# Patient Record
Sex: FEMALE | Race: BLACK OR AFRICAN AMERICAN | Employment: FULL TIME | URBAN - METROPOLITAN AREA
[De-identification: names, ages, dates, MRNs, and addresses within clinical notes are randomized per-mention and may not be internally consistent; named-entity substitution may affect disease eponyms.]

---

## 2017-05-11 RX ORDER — MYCOPHENOLATE MOFETIL 500 MG/1
TABLET ORAL
Qty: 60 TAB | Refills: 10 | Status: SHIPPED | OUTPATIENT
Start: 2017-05-11 | End: 2017-09-28

## 2017-05-19 ENCOUNTER — TELEPHONE (OUTPATIENT)
Dept: HEMATOLOGY | Age: 52
End: 2017-05-19

## 2017-05-19 NOTE — TELEPHONE ENCOUNTER
Returned pts phone call, pt was inquiring about refill for a medication. [facility] pharmacy was called to reorder meds for pt.

## 2017-05-22 RX ORDER — MYCOPHENOLATE MOFETIL 500 MG/1
TABLET ORAL
Qty: 60 TAB | Refills: 10 | Status: SHIPPED | OUTPATIENT
Start: 2017-05-22 | End: 2018-07-13 | Stop reason: SDUPTHER

## 2017-09-27 ENCOUNTER — TELEPHONE (OUTPATIENT)
Dept: HEMATOLOGY | Age: 52
End: 2017-09-27

## 2017-09-27 NOTE — TELEPHONE ENCOUNTER
Pt. Has upcoming appt. With Samanta Christine NP on 9/28/17 , Pt. States that she will not be able to attend that appt. Because she received \"a call for a reminder\" to late and she has to let her office know ahead of time to take off . .    I let pt know that Samanta Christine NP next available is not until November and she states \"that is unacceptable\" Pt. Also asked if she could see Dr. Mario Alberto Myrick , I let pt know that if she is to see Dr. Mario Alberto Myrick his next available is not until  November as well . Jean Pierre Chance Pt states she would like Samanta Christine NP or Dr. Mario Alberto Myrick to give her a call because, she feels she needs to be seen before . ..

## 2017-09-28 ENCOUNTER — HOSPITAL ENCOUNTER (OUTPATIENT)
Dept: LAB | Age: 52
Discharge: HOME | End: 2017-09-28
Payer: COMMERCIAL

## 2017-09-28 ENCOUNTER — OFFICE VISIT (OUTPATIENT)
Dept: HEMATOLOGY | Age: 52
End: 2017-09-28

## 2017-09-28 VITALS
WEIGHT: 175.2 LBS | BODY MASS INDEX: 29.91 KG/M2 | HEIGHT: 64 IN | TEMPERATURE: 97.5 F | SYSTOLIC BLOOD PRESSURE: 160 MMHG | DIASTOLIC BLOOD PRESSURE: 85 MMHG | HEART RATE: 108 BPM | RESPIRATION RATE: 16 BRPM | OXYGEN SATURATION: 100 %

## 2017-09-28 DIAGNOSIS — K75.4 AUTOIMMUNE HEPATITIS (HCC): ICD-10-CM

## 2017-09-28 DIAGNOSIS — K75.4 AUTOIMMUNE HEPATITIS (HCC): Primary | ICD-10-CM

## 2017-09-28 PROCEDURE — 80076 HEPATIC FUNCTION PANEL: CPT | Performed by: NURSE PRACTITIONER

## 2017-09-28 PROCEDURE — 85025 COMPLETE CBC W/AUTO DIFF WBC: CPT | Performed by: NURSE PRACTITIONER

## 2017-09-28 PROCEDURE — 36415 COLL VENOUS BLD VENIPUNCTURE: CPT | Performed by: NURSE PRACTITIONER

## 2017-09-28 PROCEDURE — 80048 BASIC METABOLIC PNL TOTAL CA: CPT | Performed by: NURSE PRACTITIONER

## 2017-09-28 NOTE — PROGRESS NOTES
[address] MD Tom, [address], [place], [place]       DILEEP Duarte PA-C Marylin Arm, [facility]   DILEEP Simpson NP        at [address], [address] [address]     [phone]     FAX: [phone]    at [address], [address] [facility], [address]     [phone]     FAX: [phone]       Problem List  Date Reviewed: 5/5/2016          Codes Class Noted    Autoimmune hepatitis [facility]) ICD-10-CM: K75.4  ICD-9-CM: 571.42  Unknown              Ms. Flavio Ghosh returns to the [address] regarding autoimmune hepatitis and it management. She has not been seen here since 05/2016. Her active problem list, all pertinent past medical history, medications, liver histology, endoscopic studies, radiologic findings and laboratory findings related to the liver disorder were reviewed with her. Abdulaziz Sierra had a liver biopsy in November 2010 that was consistent with autoimmune hepatitis in the setting of portal fibrosis. Ms. Flavio Ghosh is currently being treated with Cellcept 500 mg daily. The CellCept was ordered at 500 mg bid, but she self adjusted the dose without informing this office. She is tolerating treatment well. Ms. Oscar Gamble has not had any infections since her last office visit. Her transaminases have normalized. The patient has no complaints which can be attributed to liver disease. The patient completes all daily activities without any functional limitations.  The patient has not experienced fatigue, fevers, chills, shortness of breath, chest pain, pain in the right side over the liver, diffuse abdominal pain, nausea, vomiting, constipation, diarrhrea, dry eyes, dry mouth, arthralgias, myalgias, yellowing of the eyes or skin, itching, dark urine, problems concentrating, swelling of the abdomen, swelling of the lower extremities, hematemesis, or hematochezia. Allergies:  No known allergies. Current Outpatient Prescriptions   Medication Sig Dispense Refill    mycophenolate (CELLCEPT) 500 mg tablet TAKE 1 TABLET BY MOUTH TWICE DAILY 60 Tab 10       SYSTEM REVIEW NOT RELATED TO LIVER DISEASE OR REVIEWED ABOVE:  Constitution systems: Negative for fever, chills, weight gain, weight loss. Eyes: Negative for visual changes. ENT: Negative for sore throat, painful swallowing. Respiratory: Negative for cough, hemoptysis, SOB. Cardiology: Negative for chest pain, palpitations. GI:  Negative for constipation or diarrhea. : Negative for urinary frequency, dysuria, hematuria, nocturia. Skin: Negative for rash. Hematology: Negative for easy bruising, blood clots. Musculo-skelatal: Negative for back pain, muscle pain, weakness. Neurologic: Negative for headaches, dizziness, vertigo, memory problems not related to HE. Psychology: Negative for anxiety, depression. Family/Social History:  She is single with no children. She does not smoke tobacco.  She consumes alcohol rarely. She works full time as an . Physical Examination:    There were no vitals taken for this visit. General:  No acute distress. Eyes:  Sclera anicteric. ENT:  No oral lesions, thyroid normal.  Nodes:  No adenopathy. Skin:  No spider angiomata, jaundice, palmar erythema or xanthlasma. Respiratory:  Lungs clear to auscultation. Cardiovascular:  Regular heart rate, no murmurs, no edema, no JVD. Abdomen:  Soft non-tender, liver size normal to percussion/palpation. Spleen not palpable. No obvious ascites. Extremities:  No muscle wasting, no gross arthritic changes. Neurologic:  Alert and oriented, cranial nerves grossly intact, no asterixsis. Laboratory Studies:  From 05/2016:  AST/ ALT/ ALP/ T. BILI/ ALB: 23/ 22/ 107/ 0.3/ 4.0    From 10/5/2015  AST/ALT/ALP/T Bili/Alb: 19/17/91/0.5/3.7  WBC/HB/PLT: 8/13.1/244    From 12/2014  AST/ALT/ALP/T Bili: 22/21/120/0.4  WBC/HB/PLT: 6.1/12.6/212    6/2014  WBC/HgB/Plt:  5.9/12.7/201  AST/ALT/ALP/T. Bili/Alb:  24/23/124/0.4/3.8    Serologies and Other Pertinent Laboratories:  12/10:  RONAK positive, ASMA positive, ANCA negative, rheumatoid factor positive, LKM negative, SLA negative. 10/2015:  HAV antibody negative, HBV antibody negative    Liver Biopsy:  11/10:  Mild hepatitis with mild piecemeal necrosis and portal fibrosis. Biopsy slides need to be reviewed by MLS. Endoscopic Procedures:  None available. Radiology:  02/09:  Ultrasound liver - echogenic liver consistent with chronic liver disease, no liver mass lesions, no ascites. 09/10:  Ultrasound liver - echogenic liver consistent with chronic liver disease, no liver mass lesions, no ascites. Other Testing:  None available. Assessment and Plan:  Autoimmune hepatitis. Most recent labs are 14 months old. At that time the liver transaminases were normal, alkaline phosphatase was normal, tests of hepatic synthetic and metabolic function were normal.  Will perform laboratory testing to monitor liver function and degree of liver injury. This will include hepatic panel, a CBC w/ diff, a BMP. Autoimmune hepatitis is being treated with 500 mg CellCept daily. This was ordered as 500 mg bid, but the patient reduced itto 500 mg daily without informing this office. Will follow with today's labs and see if the dose needs to be adjusted back up. Vaccination for viral hepatitis A and B is recommended since the patient has no serologic evidence of previous exposure or vaccination with immunity. Alcohol counseling provided. [facility] 87 in six months.         Dk Beatty, NP   [facility] [place] [facility] [address], [address] [address] [place], [address] [place] [address]   [phone]

## 2017-09-28 NOTE — MR AVS SNAPSHOT
Visit Information Date & Time Provider Department Dept. Phone Encounter #  
 9/28/2017  4:30 PM [facility] [address] [id number] Follow-up Instructions Return in about 6 months (around 3/28/2018). Your Appointments 9/28/2017  4:30 PM  
[place] [address] [facility] ([address]) Appt Note: FOLLOW UP; f/up per Chapo Sorensen NP  
 [facility], [facility] [address]  
  
   
 [facility], [address] [place] [address] Upcoming Health Maintenance Date Due Hepatitis C Screening 1965 BREAST CANCER SCRN MAMMOGRAM 8/23/2013 FOBT Q 1 YEAR AGE 50-75 8/23/2013 PAP AKA CERVICAL CYTOLOGY 5/10/2016 INFLUENZA AGE 9 TO ADULT 8/1/2017 DTaP/Tdap/Td series (2 - Td) 1/1/2018 Allergies as of 9/28/2017  Review Complete On: 9/28/2017 By: Meredith Sena Severity Noted Reaction Type Reactions Bactrim [Sulfamethoprim Ds]  06/03/2014    Other (comments) Current Immunizations  Never Reviewed Name Date Tdap 1/1/2008 Not reviewed this visit You Were Diagnosed With   
  
 Codes Comments [facility]    -  Primary ICD-10-CM: K75.4 ICD-9-CM: 571.42 Vitals OB Status Smoking Status Postmenopausal Never Smoker Preferred Pharmacy Pharmacy Name Phone [address] [phone] Your Updated Medication List  
  
   
This list is accurate as of: 9/28/17  3:49 PM.  Always use your most recent med list.  
  
  
  
  
 mycophenolate 500 mg tablet Commonly known as:  CELLCEPT  
TAKE 1 TABLET BY MOUTH TWICE DAILY Follow-up Instructions Return in about 6 months (around 3/28/2018). Introducing [address] Amrit sr [employer] patient portal. Now you can access parts of your medical record, email your doctor's office, and request medication refills online. 1. In your internet browser, go to [email or web address] 2. Click on the First Time User? Click Here link in the Sign In box. You will see the New Member Sign Up page. 3. Enter your [employer] Access Code exactly as it appears below. You will not need to use this code after youve completed the sign-up process. If you do not sign up before the expiration date, you must request a new code. · [employer] Access Code: [email or web address] Expires: 12/27/2017  3:45 PM 
 
4. Enter the last four digits of your Social Security Number (xxxx) and Date of Birth (mm/dd/yyyy) as indicated and click Submit. You will be taken to the next sign-up page. 5. Create a [employer] ID. This will be your [employer] login ID and cannot be changed, so think of one that is secure and easy to remember. 6. Create a [employer] password. You can change your password at any time. 7. Enter your Password Reset Question and Answer. This can be used at a later time if you forget your password. 8. Enter your e-mail address. You will receive e-mail notification when new information is available in [address] 9. Click Sign Up. You can now view and download portions of your medical record. 10. Click the Download Summary menu link to download a portable copy of your medical information. If you have questions, please visit the Frequently Asked Questions section of the [employer] website. Remember, [employer] is NOT to be used for urgent needs. For medical emergencies, dial 911. Now available from your iPhone and Android! Please provide this summary of care documentation to your next provider. Your primary care clinician is listed as Live [place]. If you have any questions after today's visit, please call [phone].

## 2017-10-09 ENCOUNTER — TELEPHONE (OUTPATIENT)
Dept: HEMATOLOGY | Age: 52
End: 2017-10-09

## 2017-10-09 NOTE — TELEPHONE ENCOUNTER
[address] [facility] would like to verify if there's any changes with Mycophenolate 500 mg tablets?

## 2017-10-19 ENCOUNTER — TELEPHONE (OUTPATIENT)
Dept: HEMATOLOGY | Age: 52
End: 2017-10-19

## 2017-10-19 NOTE — TELEPHONE ENCOUNTER
Patient is calling to see what the call she missed was about. I informed her that it was to schedule an earlier appointment. Patient stated that she was just seen about two weeks ago. I informed her that I will send a message to the nurse that called her to sort out the situation. Patient confirmed understanding. Please call her at earliest convenience.

## 2017-12-28 ENCOUNTER — OFFICE VISIT (OUTPATIENT)
Dept: FAMILY MEDICINE CLINIC | Age: 52
End: 2017-12-28

## 2017-12-28 DIAGNOSIS — Z53.21 PATIENT LEFT WITHOUT BEING SEEN: Primary | ICD-10-CM

## 2018-02-02 PROBLEM — Z87.42 HISTORY OF ABNORMAL CERVICAL PAP SMEAR: Status: ACTIVE | Noted: 2018-02-02

## 2018-02-16 ENCOUNTER — TELEPHONE (OUTPATIENT)
Dept: INTERNAL MEDICINE CLINIC | Age: 53
End: 2018-02-16

## 2018-02-16 ENCOUNTER — OFFICE VISIT (OUTPATIENT)
Dept: INTERNAL MEDICINE CLINIC | Age: 53
End: 2018-02-16

## 2018-02-16 VITALS
WEIGHT: 178.8 LBS | BODY MASS INDEX: 30.52 KG/M2 | HEIGHT: 64 IN | DIASTOLIC BLOOD PRESSURE: 82 MMHG | RESPIRATION RATE: 12 BRPM | HEART RATE: 83 BPM | OXYGEN SATURATION: 99 % | TEMPERATURE: 97.7 F | SYSTOLIC BLOOD PRESSURE: 142 MMHG

## 2018-02-16 DIAGNOSIS — Z12.11 SCREENING FOR COLON CANCER: ICD-10-CM

## 2018-02-16 DIAGNOSIS — Z87.42 HISTORY OF ABNORMAL CERVICAL PAP SMEAR: ICD-10-CM

## 2018-02-16 DIAGNOSIS — K75.4 AUTOIMMUNE HEPATITIS (HCC): ICD-10-CM

## 2018-02-16 DIAGNOSIS — E66.9 OBESITY (BMI 30-39.9): ICD-10-CM

## 2018-02-16 DIAGNOSIS — Z13.220 SCREENING FOR HYPERLIPIDEMIA: ICD-10-CM

## 2018-02-16 DIAGNOSIS — Z12.31 ENCOUNTER FOR SCREENING MAMMOGRAM FOR BREAST CANCER: ICD-10-CM

## 2018-02-16 DIAGNOSIS — Z13.0 SCREENING FOR DEFICIENCY ANEMIA: ICD-10-CM

## 2018-02-16 DIAGNOSIS — R21 RASH: Primary | ICD-10-CM

## 2018-02-16 DIAGNOSIS — B36.0 TINEA VERSICOLOR: ICD-10-CM

## 2018-02-16 RX ORDER — KETOCONAZOLE 20 MG/G
CREAM TOPICAL DAILY
Qty: 60 G | Refills: 11 | Status: SHIPPED | OUTPATIENT
Start: 2018-02-16 | End: 2019-10-09

## 2018-02-16 RX ORDER — KETOCONAZOLE 20 MG/G
CREAM TOPICAL DAILY
Qty: 60 G | Refills: 11 | Status: SHIPPED | OUTPATIENT
Start: 2018-02-16 | End: 2018-02-16 | Stop reason: SDUPTHER

## 2018-02-16 RX ORDER — AMOXICILLIN 500 MG/1
500 CAPSULE ORAL 4 TIMES DAILY
COMMUNITY
Start: 2018-01-08 | End: 2018-02-16 | Stop reason: ALTCHOICE

## 2018-02-16 NOTE — MR AVS SNAPSHOT
[facility] 
 
 
 [address], [address] [facility] 
[phone] Patient: Gladys Weeks MRN: [id number] :1965 Visit Information Date & Time Provider Department Dept. Phone Encounter #  
 2018 10:00 AM Cuate Artis MD [facility] [phone] [id number] Follow-up Instructions Return in about 2 weeks (around 3/2/2018) for weight check. Your Appointments 3/9/2018 11:00 AM  
Office Visit with Cuate Artis MD  
[facility] [facility]) Appt Note: ov per TELECARE [facility] [address], [address] [place] [address] [place]  
  
   
 [address], [address]  
  
    
 3/28/2018  8:00 AM  
Follow Up with Umu Pedraza NP [address] [facility] ([address]) Appt Note: 6mnth f/up [address], [place] [address] [facility] [address] [facility] [address]  
  
   
 [address], [address] [facility] Upcoming Health Maintenance Date Due Hepatitis C Screening 1965 BREAST CANCER SCRN MAMMOGRAM 2013 FOBT Q 1 YEAR AGE 50-75 2013 PAP AKA CERVICAL CYTOLOGY 5/10/2016 Influenza Age 5 to Adult 2017 DTaP/Tdap/Td series (2 - Td) 2018 Allergies as of 2018  Review Complete On: 2018 By: Vivi Bolus Severity Noted Reaction Type Reactions Bactrim [Sulfamethoprim Ds]  2014    Other (comments) Current Immunizations  Never Reviewed Name Date Tdap 2008 Not reviewed this visit You Were Diagnosed With   
  
 Codes Comments Rash    -  Primary ICD-10-CM: R21 
ICD-9-CM: 782.1 Autoimmune hepatitis ([facility]     ICD-10-CM: K75.4 ICD-9-CM: 571.42 Screening for deficiency anemia     ICD-10-CM: Z13.0 ICD-9-CM: V78.1 Obesity (BMI 30-39. 9)     ICD-10-CM: E66.9 ICD-9-CM: 278.00   
 History of abnormal cervical Pap smear     ICD-10-CM: Z87.898 ICD-9-CM: V13.29 Encounter for screening mammogram for breast cancer     ICD-10-CM: Z12.31 
ICD-9-CM: V76.12 Tinea versicolor     ICD-10-CM: B36.0 ICD-9-CM: 111.0 Screening for colon cancer     ICD-10-CM: Z12.11 ICD-9-CM: V76.51 Vitals BP Pulse Temp Resp Height(growth percentile) Weight(growth percentile) 142/82 (BP 1 Location: Left arm, BP Patient Position: Sitting) 83 97.7 °F (36.5 °C) (Oral) 12 5' 4\" (1.626 m) 178 lb 12.8 oz (81.1 kg) SpO2 BMI OB Status Smoking Status 99% 30.69 kg/m2 Postmenopausal Never Smoker BMI and BSA Data Body Mass Index Body Surface Area  
 30.69 kg/m 2 1.91 m 2 Preferred Pharmacy Pharmacy Name Phone [address] [phone] Your Updated Medication List  
  
   
This list is accurate as of: 2/16/18 10:59 AM.  Always use your most recent med list.  
  
  
  
  
 ketoconazole 2 % topical cream  
Commonly known as:  Jodell Motto Apply  to affected area daily. mycophenolate 500 mg tablet Commonly known as:  CELLCEPT  
TAKE 1 TABLET BY MOUTH TWICE DAILY Prescriptions Sent to Pharmacy Refills  
 ketoconazole (NIZORAL) 2 % topical cream 11 Sig: Apply  to affected area daily. Class: Normal  
 Pharmacy: [facility]  #: [phone] Route: Topical  
  
We Performed the Following REFERRAL TO GYNECOLOGY [REF30 Custom] Comments:  
 Female provider only per pt request.  
  
Follow-up Instructions Return in about 2 weeks (around 3/2/2018) for weight check. To-Do List   
 02/16/2018 Lab:  RONAK COMPREHENSIVE PANEL   
  
 02/16/2018 Lab:  C REACTIVE PROTEIN, QT   
  
 02/16/2018 Lab:  CBC WITH AUTOMATED DIFF Around 02/16/2018 Lab:  HEMOGLOBIN A1C W/O EAG   
  
 02/16/2018   Lab:  LIPID PANEL   
 02/16/2018 Imaging:  OFELIA MAMMO BI SCREENING INCL CAD   
  
 02/16/2018 Lab:  METABOLIC PANEL, COMPREHENSIVE   
  
 02/16/2018 Lab:  OCCULT BLOOD, IMMUNOASSAY (FIT)   
  
 02/16/2018 Lab:  TSH AND FREE T4 Referral Information Referral ID Referred By Referred To  
  
 [id number] Mukul Jhaveri Not Available Visits Status Start Date End Date 1 New Request 2/16/18 2/16/19 If your referral has a status of pending review or denied, additional information will be sent to support the outcome of this decision. Patient Instructions Patient was given a copy of the Advanced Medical Directive form and understands to bring it in once completed. Health Maintenance Due Topic Date Due  
 Hepatitis C Screening  1965  BREAST CANCER SCRN MAMMOGRAM  08/23/2013  FOBT Q 1 YEAR AGE 50-75  08/23/2013  PAP AKA CERVICAL CYTOLOGY  05/10/2016  Influenza Age 5 to Adult  08/01/2017  
 DTaP/Tdap/Td series (2 - Td) 01/01/2018 Body Mass Index: Care Instructions Your Care Instructions Body mass index (BMI) can help you see if your weight is raising your risk for health problems. It uses a formula to compare how much you weigh with how tall you are. · A BMI lower than 18.5 is considered underweight. · A BMI between 18.5 and 24.9 is considered healthy. · A BMI between 25 and 29.9 is considered overweight. A BMI of 30 or higher is considered obese. If your BMI is in the normal range, it means that you have a lower risk for weight-related health problems. If your BMI is in the overweight or obese range, you may be at increased risk for weight-related health problems, such as high blood pressure, heart disease, stroke, arthritis or joint pain, and diabetes.  If your BMI is in the underweight range, you may be at increased risk for health problems such as fatigue, lower protection (immunity) against illness, muscle loss, bone loss, hair loss, and hormone problems. BMI is just one measure of your risk for weight-related health problems. You may be at higher risk for health problems if you are not active, you eat an unhealthy diet, or you drink too much alcohol or use tobacco products. Follow-up care is a key part of your treatment and safety. Be sure to make and go to all appointments, and call your doctor if you are having problems. It's also a good idea to know your test results and keep a list of the medicines you take. How can you care for yourself at home? · Practice healthy eating habits. This includes eating plenty of fruits, vegetables, whole grains, lean protein, and low-fat dairy. · If your doctor recommends it, get more exercise. Walking is a good choice. Bit by bit, increase the amount you walk every day. Try for at least 30 minutes on most days of the week. · Do not smoke. Smoking can increase your risk for health problems. If you need help quitting, talk to your doctor about stop-smoking programs and medicines. These can increase your chances of quitting for good. · Limit alcohol to 2 drinks a day for men and 1 drink a day for women. Too much alcohol can cause health problems. If you have a BMI higher than 25 · Your doctor may do other tests to check your risk for weight-related health problems. This may include measuring the distance around your waist. A waist measurement of more than 40 inches in men or 35 inches in women can increase the risk of weight-related health problems. · Talk with your doctor about steps you can take to stay healthy or improve your health. You may need to make lifestyle changes to lose weight and stay healthy, such as changing your diet and getting regular exercise. If you have a BMI lower than 18.5 · Your doctor may do other tests to check your risk for health problems. · Talk with your doctor about steps you can take to stay healthy or improve your health.  You may need to make lifestyle changes to gain or maintain weight and stay healthy, such as getting more healthy foods in your diet and doing exercises to build muscle. Where can you learn more? Go to http://tom-javi.info/. Enter S176 in the search box to learn more about \"Body Mass Index: Care Instructions. \" Current as of: October 13, 2016 Content Version: 11.4 © 0229-3156 [employer]. Care instructions adapted under license by [employer] (which disclaims liability or warranty for this information). If you have questions about a medical condition or this instruction, always ask your healthcare professional. [address] any warranty or liability for your use of this information. Introducing [facility]! Dear Muna Flores: Thank you for requesting a [employer] account. Our records indicate that you already have an active [employer] account. You can access your account anytime at [email or web address] Did you know that you can access your hospital and ER discharge instructions at any time in [employer]? You can also review all of your test results from your hospital stay or ER visit. Additional Information If you have questions, please visit the Frequently Asked Questions section of the [employer] website at [email or web address] Remember, [employer] is NOT to be used for urgent needs. For medical emergencies, dial 911. Now available from your iPhone and Android! Please provide this summary of care documentation to your next provider. Your primary care clinician is listed as Antwan Silva. If you have any questions after today's visit, please call [phone].

## 2018-02-16 NOTE — PATIENT INSTRUCTIONS
Patient was given a copy of the Advanced Medical Directive form and understands to bring it in once completed. Health Maintenance Due   Topic Date Due    Hepatitis C Screening  1965    BREAST CANCER SCRN MAMMOGRAM  08/23/2013    FOBT Q 1 YEAR AGE 50-75  08/23/2013    PAP AKA CERVICAL CYTOLOGY  05/10/2016    Influenza Age 5 to Adult  08/01/2017    DTaP/Tdap/Td series (2 - Td) 01/01/2018          Body Mass Index: Care Instructions  Your Care Instructions    Body mass index (BMI) can help you see if your weight is raising your risk for health problems. It uses a formula to compare how much you weigh with how tall you are. · A BMI lower than 18.5 is considered underweight. · A BMI between 18.5 and 24.9 is considered healthy. · A BMI between 25 and 29.9 is considered overweight. A BMI of 30 or higher is considered obese. If your BMI is in the normal range, it means that you have a lower risk for weight-related health problems. If your BMI is in the overweight or obese range, you may be at increased risk for weight-related health problems, such as high blood pressure, heart disease, stroke, arthritis or joint pain, and diabetes. If your BMI is in the underweight range, you may be at increased risk for health problems such as fatigue, lower protection (immunity) against illness, muscle loss, bone loss, hair loss, and hormone problems. BMI is just one measure of your risk for weight-related health problems. You may be at higher risk for health problems if you are not active, you eat an unhealthy diet, or you drink too much alcohol or use tobacco products. Follow-up care is a key part of your treatment and safety. Be sure to make and go to all appointments, and call your doctor if you are having problems. It's also a good idea to know your test results and keep a list of the medicines you take. How can you care for yourself at home? · Practice healthy eating habits.  This includes eating plenty of fruits, vegetables, whole grains, lean protein, and low-fat dairy. · If your doctor recommends it, get more exercise. Walking is a good choice. Bit by bit, increase the amount you walk every day. Try for at least 30 minutes on most days of the week. · Do not smoke. Smoking can increase your risk for health problems. If you need help quitting, talk to your doctor about stop-smoking programs and medicines. These can increase your chances of quitting for good. · Limit alcohol to 2 drinks a day for men and 1 drink a day for women. Too much alcohol can cause health problems. If you have a BMI higher than 25  · Your doctor may do other tests to check your risk for weight-related health problems. This may include measuring the distance around your waist. A waist measurement of more than 40 inches in men or 35 inches in women can increase the risk of weight-related health problems. · Talk with your doctor about steps you can take to stay healthy or improve your health. You may need to make lifestyle changes to lose weight and stay healthy, such as changing your diet and getting regular exercise. If you have a BMI lower than 18.5  · Your doctor may do other tests to check your risk for health problems. · Talk with your doctor about steps you can take to stay healthy or improve your health. You may need to make lifestyle changes to gain or maintain weight and stay healthy, such as getting more healthy foods in your diet and doing exercises to build muscle. Where can you learn more? Go to http://tom-javi.info/. Enter S176 in the search box to learn more about \"Body Mass Index: Care Instructions. \"  Current as of: October 13, 2016  Content Version: 11.4  © 0494-7260 [employer]. Care instructions adapted under license by [employer] (which disclaims liability or warranty for this information).  If you have questions about a medical condition or this instruction, always ask your healthcare professional. [address] any warranty or liability for your use of this information.

## 2018-02-16 NOTE — TELEPHONE ENCOUNTER
Rx sent to the requested pharmacy.     Dr. Skyler Sinclair  [facility], [address]  Phone: [phone]  Fax: [phone]

## 2018-02-16 NOTE — PROGRESS NOTES
Chief Complaint   Patient presents with    Establish Care    Rash     x 1 month with a Rash on the Right Buttock     Patient was given a copy of the Advanced Medical Directive form and understands to bring it in once completed. 1. Have you been to the ER, urgent care clinic since your last visit? Hospitalized since your last visit? No    2. Have you seen or consulted any other health care providers outside of the [address] [place] [address] since your last visit? Include any pap smears or colon screening.  No

## 2018-02-16 NOTE — PROGRESS NOTES
[facility]:  2/18/2018, MRN: [id number]      Lm Arrington is a 45 y.o. female and presents to clinic to Arianna Cobos [facility] and Rash (x 1 month with a Rash on the Right Buttock)    Subjective: The pt is a 46yo female with h/o an abnormal PAP, RA,  and autoimmune hepatitis. 1. Autoimmune Hepatitis: Followed by the [facility]. On cellcept. She had a biopsy in 2010 to confirm her diagnosis. No fever, chills, or abdominal pain sx. 2. Abnormal PAP smears: Years ago, she had abnormal PAP smears. S/p LEEP. F/u PAP smears were unremarkable. She did not have a PAP smear last year. She is not followed by a gynecologist.     3. RA: Diagnosed in 2014. She was followed by Rheumatology in the past but does not follow with one now. No jt pain. No SOB. Previous painful joints included: knees and hands. 4. Buttock Rash: Present x 1 month. Associated with redness. It started to itch last night. No fever/chills. This is present along her right buttock. The rash is not painful. No alleviating factors are known. 5. Health Maintenance:  - Last PAP: 2 yrs ago  - Last colonoscopy: She has never had a colonoscopy. Patient Active Problem List    Diagnosis Date Noted    History of abnormal cervical Pap smear 02/02/2018    Autoimmune hepatitis ([facility] [address]        Current Outpatient Prescriptions   Medication Sig Dispense Refill    ketoconazole (NIZORAL) 2 % topical cream Apply  to affected area daily.  60 g 11    mycophenolate (CELLCEPT) 500 mg tablet TAKE 1 TABLET BY MOUTH TWICE DAILY 60 Tab 10       Allergies   Allergen Reactions    Bactrim [Sulfamethoprim Ds] Other (comments)       Past Medical History:   Diagnosis Date    Arthritis     knees and hands    Autoimmune hepatitis (HCC)     Dr Doreen Nguyen, cellcept    Calculus of kidney     Hypertension     Rheumatoid arthritis(714.0)     Dr. Rosa Ellington       Past Surgical History:   Procedure Laterality Date    BIOPSY LIVER  11/10    Mild hepatitis with mild piecemeal necrosis & portal fibrosis. Biopsy slides need to be reviewed by MLS.  HX WISDOM TEETH EXTRACTION      x3    NEUROLOGICAL PROCEDURE UNLISTED  1989    Cryosurgery for cervical lesion       Family History   Problem Relation Age of Onset    Hypertension Mother    Ade Murphy Elevated Lipids Mother     Diabetes Mother    Ade Platebob Cancer Mother      breast    Stroke Father      x2    Cancer Maternal Aunt 68     colon    No Known Problems Brother     No Known Problems Maternal Grandmother     No Known Problems Maternal Grandfather     No Known Problems Paternal Grandmother     No Known Problems Paternal Grandfather        Social History   Substance Use Topics    Smoking status: Never Smoker    Smokeless tobacco: Never Used    Alcohol use No      Comment: Wine cooler rarely       ROS   Review of Systems   Constitutional: Negative for chills and fever. HENT: Negative for ear pain and sore throat. Eyes: Negative for blurred vision and pain. Respiratory: Negative for cough and shortness of breath. Cardiovascular: Negative for chest pain. Gastrointestinal: Negative for abdominal pain, blood in stool and melena. Genitourinary: Negative for dysuria and hematuria. Musculoskeletal: Negative for joint pain and myalgias. Skin: Positive for rash. Neurological: Negative for tingling, focal weakness and headaches. Endo/Heme/Allergies: Does not bruise/bleed easily. Psychiatric/Behavioral: Negative for substance abuse. Objective     Vitals:    02/16/18 1012   BP: 142/82   Pulse: 83   Resp: 12   Temp: 97.7 °F (36.5 °C)   TempSrc: Oral   SpO2: 99%   Weight: 178 lb 12.8 oz (81.1 kg)   Height: 5' 4\" (1.626 m)   PainSc:   0 - No pain       Physical Exam   Constitutional: She is oriented to person, place, and time and well-developed, well-nourished, and in no distress. HENT:   Head: Normocephalic and atraumatic.    Right Ear: External ear normal.   Left Ear: External ear normal.   Nose: Nose normal. Mouth/Throat: Oropharynx is clear and moist. No oropharyngeal exudate. Clear TMs   Eyes: Conjunctivae and EOM are normal. Pupils are equal, round, and reactive to light. Right eye exhibits no discharge. Left eye exhibits no discharge. No scleral icterus. Neck: Neck supple. Cardiovascular: Normal rate, regular rhythm, normal heart sounds and intact distal pulses. Exam reveals no gallop and no friction rub. No murmur heard. Pulmonary/Chest: Effort normal and breath sounds normal. No respiratory distress. She has no wheezes. She has no rales. Abdominal: Soft. Bowel sounds are normal. She exhibits no distension. There is no tenderness. There is no rebound and no guarding. Musculoskeletal: She exhibits no edema or tenderness (BUE). Lymphadenopathy:     She has no cervical adenopathy. Neurological: She is alert and oriented to person, place, and time. She exhibits normal muscle tone. Gait normal.   Skin: Skin is warm and dry. No erythema. Hyperpigmented circular rash with central clearing is noted along her right upper gluteal cleft w/o erythema. Psychiatric: Affect normal.   Nursing note and vitals reviewed. LABS   Data Review:   Lab Results   Component Value Date/Time    WBC 4.2 (L) 09/10/2010 08:56 AM    HGB 12.7 09/10/2010 08:56 AM    HCT 39.5 09/10/2010 08:56 AM    PLATELET 526 48/59/3961 08:56 AM    MCV 78.8 09/10/2010 08:56 AM       Lab Results   Component Value Date/Time    Sodium 143 09/10/2010 08:56 AM    Potassium 5.1 09/10/2010 08:56 AM    Chloride 109 (H) 09/10/2010 08:56 AM    CO2 32 09/10/2010 08:56 AM    Anion gap 2 (L) 09/10/2010 08:56 AM    Glucose 86 09/10/2010 08:56 AM    BUN 18 09/10/2010 08:56 AM    Creatinine 0.9 09/10/2010 08:56 AM    BUN/Creatinine ratio 20 09/10/2010 08:56 AM    GFR est AA >60 09/10/2010 08:56 AM    GFR est non-AA >60 09/10/2010 08:56 AM    Calcium 9.3 09/10/2010 08:56 AM       Assessment/Plan:   1. Rash: Likely fungal per PE findings.   Antifungal cream prescribed. Return to clinic if symptoms worsen or do not resolve. - Checking labs. ORDERS:  - ketoconazole (NIZORAL) 2 % topical cream; Apply  to affected area daily. Dispense: 60 g; Refill: 11  - METABOLIC PANEL, COMPREHENSIVE; Future  - C REACTIVE PROTEIN, QT; Future  - CBC WITH AUTOMATED DIFF; Future  - TSH AND FREE T4; Future    2. Autoimmune hepatitis: Followed by the [facility]. Checking a CMP, CRP, CBC, RONAK, and TFTs. Continue follow-up with the [facility]. Continue with medication per their recommendations. ORDERS:  - METABOLIC PANEL, COMPREHENSIVE; Future  - C REACTIVE PROTEIN, QT; Future  - CBC WITH AUTOMATED DIFF; Future  - RONAK COMPREHENSIVE PANEL; Future  - TSH AND FREE T4; Future    3. Health Maintenance:  Screen for hyperlipidemia with a lipid panel and for anemia with a CBC. I stressed the importance of weight reduction as a means to reduce her overall cardiovascular risk. I encouraged her to maintain a heart healthy diet and to get regular aerobic exercise. I will recheck her weight at her follow-up appointment. Checking an A1c and TFTs since she is not her target weight. A mammogram was ordered to screen for breast cancer. Ordering an occult blood stool test to screen for colon cancer per patient request in place of colonoscopy. ORDERS:  - LIPID PANEL; Future  - CBC WITH AUTOMATED DIFF; Future  - HEMOGLOBIN A1C W/O EAG; Future  - TSH AND FREE T4; Future  - OFEILA MAMMO BI SCREENING INCL CAD; Future  - OCCULT BLOOD, IMMUNOASSAY (FIT); Future    4. History of abnormal cervical Pap smear:  S/p LEEP  Placing a referral to gynecology for cervical cancer screening.     ORDERS:   - REFERRAL TO GYNECOLOGY      Health Maintenance Due   Topic Date Due    Hepatitis C Screening  1965    BREAST CANCER SCRN MAMMOGRAM  08/23/2013    FOBT Q 1 YEAR AGE 50-75  08/23/2013    PAP AKA CERVICAL CYTOLOGY  05/10/2016    Influenza Age 9 to Adult  08/01/2017   [facility] DTaP/Tdap/Td series (2 - Td) 01/01/2018     Lab review: labs are reviewed in the EHR and ordered as mentioned above    I have discussed the diagnosis with the patient and the intended plan as seen in the above orders. The patient has received an after-visit summary and questions were answered concerning future plans. I have discussed medication side effects and warnings with the patient as well. I have reviewed the plan of care with the patient, accepted their input and they are in agreement with the treatment goals. All questions were answered. The patient understands the plan of care. Handouts provided today with above information. Pt instructed if symptoms worsen to call the office or report to the ED for continued care. Greater than 50% of the visit time was spent in counseling and/or coordination of care. Follow-up Disposition:  Return in about 2 weeks (around 3/2/2018) for weight check.     Nina Huerta MD

## 2018-02-18 PROBLEM — E66.9 OBESITY (BMI 30-39.9): Status: ACTIVE | Noted: 2018-02-18

## 2018-02-27 ENCOUNTER — HOSPITAL ENCOUNTER (OUTPATIENT)
Dept: MAMMOGRAPHY | Age: 53
Discharge: HOME | End: 2018-02-27
Attending: INTERNAL MEDICINE
Payer: COMMERCIAL

## 2018-02-27 DIAGNOSIS — Z12.31 ENCOUNTER FOR SCREENING MAMMOGRAM FOR BREAST CANCER: ICD-10-CM

## 2018-02-27 PROCEDURE — 77067 SCR MAMMO BI INCL CAD: CPT

## 2018-03-14 ENCOUNTER — TELEPHONE (OUTPATIENT)
Dept: INTERNAL MEDICINE CLINIC | Age: 53
End: 2018-03-14

## 2018-03-14 NOTE — PROGRESS NOTES
Please let the pt know that her mammogram shows no evidence of breast cancer.     Dr. Filipe Pacheco  [facility], [facility], [address]  Phone: [phone]  Fax: [phone]

## 2018-03-14 NOTE — TELEPHONE ENCOUNTER
Called patient back she is requesting a reason why she has to take the stool cards to the lab.   It was explained to patient that the lab does not run the test that are mailed to them ad that it takes to long for them to receive the test.  Patient wanted to know the cost of the test. I told her that I did not know that answer but that the lab that she takes the test to should be able to give her the cost.   Patient was not happy with that response and wanted to have Practice Manager give her a call

## 2018-03-14 NOTE — TELEPHONE ENCOUNTER
Pt has a stool sample card from Dr Nina Morrison and called her insurance co who told her if she takes it to the hospital lab she will be charged an admittance fee to process it. Told her it would be better to mail it. I spoke with Adelso Negro and she said can't mail because sample would be too old by the time they receive it to process it. Has to be taken to hospital lab and she has never heard of an insurance co charging an admittance fee for a stool sample. Gave info to pt who then requested to speak with Adelso Negro directly.   Please call her at [phone]

## 2018-03-15 ENCOUNTER — DOCUMENTATION ONLY (OUTPATIENT)
Dept: SURGERY | Age: 53
End: 2018-03-15

## 2018-03-15 ENCOUNTER — TELEPHONE (OUTPATIENT)
Dept: INTERNAL MEDICINE CLINIC | Age: 53
End: 2018-03-15

## 2018-03-15 ENCOUNTER — TELEPHONE (OUTPATIENT)
Dept: FAMILY MEDICINE CLINIC | Age: 53
End: 2018-03-15

## 2018-03-15 DIAGNOSIS — Z91.89 AT HIGH RISK FOR BREAST CANCER: Primary | ICD-10-CM

## 2018-03-15 NOTE — TELEPHONE ENCOUNTER
Pt returned call. Says she did see the missed call. Number is good. Says if you hold on the line you will hear a beep to leave a message. She was notified Abbey Weir will return call this afternoon when she gets back to office.

## 2018-03-15 NOTE — PROGRESS NOTES
Patient's lifetime risk for developing breast cancer was calculated using the information supplied by the pt on the [facility]. The Tyrer-Cuzick Model was used. Patient's score came out to be >20%, therefore standards indicate she should have an annual breast MRI ordered in addition to an annual Mammogram.  It is also recommended that the patient have a clinical breast exam every 6 months. Dr. Gladys Carrera and Dr. Ava Gunn would be glad to see any patients to enroll them in our high risk program. Please call the Breast Nurse Navigator at [phone] if you have further questions.   (A copy of this note was routed to pt's referring provider, Dr. Preethi Pisano.)

## 2018-03-15 NOTE — TELEPHONE ENCOUNTER
Spoke with patient and advised that I emailed the lab supervisor and she confirmed that there will be no lab collection fee charged to the insurance for dropping off the FIT sample. Patient can drop off sample to outpatient lab at [facility] or [facility]. Please make sure that lab requisition accompanies the sample.

## 2018-03-15 NOTE — TELEPHONE ENCOUNTER
Please let the pt know that her labs showed normal thyroid function. Her liver function labs were normal. Her kidney function was normal. Her total cholesterol is 183, HDL is 54, LDL is 109, and her triglycerides are 109. Her 10 year CVD risk per the ACC/AHA risk assessment is 2%. Statin therapy is not warranted given his low risk. Her A1C was 5.1. Thus there is no evidence of diabetes or prediabetes. Her WBC, Hemoglobin/Hematocrit, and platelet counts are normal. No anemia is present.     Dr. Servin Course  [facility] [address], [address] [place] [address] Str.  Phone: [phone]  Fax: [phone]

## 2018-03-15 NOTE — TELEPHONE ENCOUNTER
Please notify the pt that: The patient's lifetime risk for developing breast cancer was calculated using the information supplied by the pt on the [facility]. The Tyrer-Cuzick Model was used. Patient's score came out to be >20%, therefore standards indicate she should have an annual breast MRI ordered in addition to an annual Mammogram.  It is also recommended that the patient have a clinical breast exam every 6 months. Dr. Vasile Mendez and Dr. Avtar Castellanos would be glad to see any patients to enroll them in our high risk program. Please have her call the Breast Nurse Navigator at [phone] if she have further questions. Meanwhile, I will refer her to 's office for breast cancer screening evaluation/recommendations given her higher risk of breast cancer. Per her risk, she should have more than yearly mammograms as mentioned above.     Dr. Sherrel Dakins  [facility] [address], [address] [place] [address] Str.  Phone: [phone]  Fax: [phone]

## 2018-03-15 NOTE — TELEPHONE ENCOUNTER
Nathan Rosen tried to call pt. Phone did not connect to machine or allow to leave message. She will try calling patient again this afternoon when in office. If patient calls please verify correct number to reach her.

## 2018-03-19 ENCOUNTER — TELEPHONE (OUTPATIENT)
Dept: INTERNAL MEDICINE CLINIC | Age: 53
End: 2018-03-19

## 2018-03-19 ENCOUNTER — DOCUMENTATION ONLY (OUTPATIENT)
Dept: SURGERY | Age: 53
End: 2018-03-19

## 2018-03-19 NOTE — PROGRESS NOTES
Received call from patient concerning her recommendation for the breast high risk program.  Pt questions answered to her satisfaction. Informed pt that the only information missing from her mammography questionnaire was the age her period began. Pt stated it began at the age of 6. Told pt that I would recalculate her score and if it came back less than her current score of 26.3%, I would let her know. Once call ended w/ pt, her Barrie Craig score was recalculated to include her age of menarche and her new score came out higher at 31.2%. A copy of this note will be routed to Dr. Viraj Ramsay.   Dr. Viraj Ramsay is referring pt to Dr. Jazz Kapoor for further evaluation for the high risk program.

## 2018-03-20 ENCOUNTER — OFFICE VISIT (OUTPATIENT)
Dept: INTERNAL MEDICINE CLINIC | Age: 53
End: 2018-03-20

## 2018-03-20 VITALS
TEMPERATURE: 97.5 F | WEIGHT: 180 LBS | BODY MASS INDEX: 30.73 KG/M2 | DIASTOLIC BLOOD PRESSURE: 88 MMHG | SYSTOLIC BLOOD PRESSURE: 167 MMHG | HEIGHT: 64 IN | OXYGEN SATURATION: 100 % | RESPIRATION RATE: 16 BRPM | HEART RATE: 103 BPM

## 2018-03-20 DIAGNOSIS — Z87.42 HISTORY OF ABNORMAL CERVICAL PAP SMEAR: ICD-10-CM

## 2018-03-20 DIAGNOSIS — E66.9 OBESITY (BMI 30-39.9): ICD-10-CM

## 2018-03-20 DIAGNOSIS — K75.4 AUTOIMMUNE HEPATITIS (HCC): Primary | ICD-10-CM

## 2018-03-20 DIAGNOSIS — Z11.59 NEED FOR HEPATITIS C SCREENING TEST: ICD-10-CM

## 2018-03-20 RX ORDER — MULTIVITAMIN WITH IRON
1 TABLET ORAL DAILY
COMMUNITY

## 2018-03-20 RX ORDER — GLUCOSAMINE SULFATE 1500 MG
POWDER IN PACKET (EA) ORAL DAILY
COMMUNITY

## 2018-03-20 RX ORDER — FERROUS FUMARATE 324(106)MG
TABLET ORAL
COMMUNITY

## 2018-03-20 NOTE — PROGRESS NOTES
[facility]:  3/20/2018, MRN: [id number]      Parisa Evangelista is a 45 y.o. female and presents to clinic for Weight Management (weight check)    Subjective: The pt is a 46yo female with h/o an abnormal PAP, RA,  and autoimmune hepatitis. 1. H/o abnormal PAP: She was referred at her last apt to GYN team given h/o an abnormal PAP requiring a LEEP procedure years ago. She is overdue for cervical cancer screening. She plans to schedule her GYN appointment for cervical cancer screening. She has been very busy with her job and has had no available time for an appointment. 2.  General health maintenance: The patient's most recent labs show a reduced CVD risk per the ACC/AHA risk assessment results. Statin therapy assessment warranted. She also has history of autoimmune hepatitis but there is no evidence of this condition flaring up at present. She does not have diabetes or prediabetes. Her kidney function is normal.  She has a follow-up appointment with the [facility] per her history given autoimmune hepatitis history. 3.  High risk for breast cancer: Per her risk factors, the patient has an over 20% risk of cancer during her lifetime. Although her mammogram is reassuring, it was recommended that she have additional screening surveillance. As result, a referral was placed to Dr. Renetta Mejias office. The patient has yet to schedule an appointment. Patient Active Problem List    Diagnosis Date Noted    Obesity (BMI 30-39.9) 02/18/2018    History of abnormal cervical Pap smear 02/02/2018    Autoimmune hepatitis ([facility] [address]        Current Outpatient Prescriptions   Medication Sig Dispense Refill    multivitamin with iron (DAILY MULTI-VITAMINS/IRON) tablet Take 1 Tab by mouth daily.  ferrous fumarate 324 mg (106 mg iron) tab Take  by mouth.  cholecalciferol (VITAMIN D3) 1,000 unit cap Take  by mouth daily.       ketoconazole (NIZORAL) 2 % topical cream Apply  to affected area daily. 60 g 11    mycophenolate (CELLCEPT) 500 mg tablet TAKE 1 TABLET BY MOUTH TWICE DAILY 60 Tab 10       Allergies   Allergen Reactions    Bactrim [Sulfamethoprim Ds] Other (comments)       Past Medical History:   Diagnosis Date    Arthritis     knees and hands    Autoimmune hepatitis (HCC)     Dr Karina Marx, cellcept    Calculus of kidney     Hypertension     Rheumatoid arthritis(714.0)     Dr. Tano Macedo       Past Surgical History:   Procedure Laterality Date    BIOPSY LIVER  11/10    Mild hepatitis with mild piecemeal necrosis & portal fibrosis. Biopsy slides need to be reviewed by MLS.  HX WISDOM TEETH EXTRACTION      x3    NEUROLOGICAL PROCEDURE UNLISTED  1989    Cryosurgery for cervical lesion       Family History   Problem Relation Age of Onset    Hypertension Mother    Marquez Shaw Elevated Lipids Mother     Diabetes Mother    Marquez Nutvera Cancer Mother      breast    Stroke Father      x2    Cancer Maternal Aunt 68     colon    No Known Problems Brother     No Known Problems Maternal Grandmother     No Known Problems Maternal Grandfather     No Known Problems Paternal Grandmother     No Known Problems Paternal Grandfather        Social History   Substance Use Topics    Smoking status: Never Smoker    Smokeless tobacco: Never Used    Alcohol use No      Comment: Wine cooler rarely       ROS   Review of Systems   Constitutional: Negative for chills and fever. HENT: Negative for ear pain and sore throat. Eyes: Negative for blurred vision and pain. Respiratory: Negative for cough and shortness of breath. Cardiovascular: Negative for chest pain. Gastrointestinal: Negative for abdominal pain, blood in stool and melena. Genitourinary: Negative for dysuria and hematuria. Musculoskeletal: Negative for joint pain and myalgias. Skin: Negative for rash. Neurological: Negative for tingling, focal weakness and headaches. Endo/Heme/Allergies: Does not bruise/bleed easily.    Psychiatric/Behavioral: Negative for substance abuse. Objective     Vitals:    03/20/18 0825   BP: 167/88   Pulse: (!) 103   Resp: 16   Temp: 97.5 °F (36.4 °C)   SpO2: 100%   Weight: 180 lb (81.6 kg)   Height: 5' 4\" (1.626 m)   PainSc:   0 - No pain       Physical Exam   Constitutional: She is oriented to person, place, and time and well-developed, well-nourished, and in no distress. HENT:   Head: Normocephalic and atraumatic. Right Ear: External ear normal.   Left Ear: External ear normal.   Nose: Nose normal.   Mouth/Throat: Oropharynx is clear and moist. No oropharyngeal exudate. Eyes: Conjunctivae and EOM are normal. Right eye exhibits no discharge. Left eye exhibits no discharge. No scleral icterus. Neck: Neck supple. Cardiovascular: Normal rate, regular rhythm, normal heart sounds and intact distal pulses. Exam reveals no gallop and no friction rub. No murmur heard. Pulmonary/Chest: Effort normal and breath sounds normal. No respiratory distress. She has no wheezes. She has no rales. Abdominal: Soft. Bowel sounds are normal. She exhibits no distension. There is no tenderness. There is no rebound and no guarding. Musculoskeletal: She exhibits no edema or tenderness (BUE). Lymphadenopathy:     She has no cervical adenopathy. Neurological: She is alert and oriented to person, place, and time. She exhibits normal muscle tone. Gait normal.   Skin: Skin is warm and dry. No erythema. Psychiatric: Affect normal.   Nursing note and vitals reviewed.       LABS   Data Review:   Lab Results   Component Value Date/Time    WBC 4.2 (L) 09/10/2010 08:56 AM    HGB 12.7 09/10/2010 08:56 AM    HCT 39.5 09/10/2010 08:56 AM    PLATELET 090 22/04/0261 08:56 AM    MCV 78.8 09/10/2010 08:56 AM       Lab Results   Component Value Date/Time    Sodium 143 09/10/2010 08:56 AM    Potassium 5.1 09/10/2010 08:56 AM    Chloride 109 (H) 09/10/2010 08:56 AM    CO2 32 09/10/2010 08:56 AM    Anion gap 2 (L) 09/10/2010 08:56 AM    Glucose 86 09/10/2010 08:56 AM    BUN 18 09/10/2010 08:56 AM    Creatinine 0.9 09/10/2010 08:56 AM    BUN/Creatinine ratio 20 09/10/2010 08:56 AM    GFR est AA >60 09/10/2010 08:56 AM    GFR est non-AA >60 09/10/2010 08:56 AM    Calcium 9.3 09/10/2010 08:56 AM       Assessment/Plan:   1. History of autoimmune hepatitis: Stable.    -I encouraged her to go to her follow-up appointments with the [facility]. Continue with their treatment plan. 2.  History of abnormal Pap smear: Status post LEEP procedure.  -I encouraged her to schedule her appointment with the gynecology team for cervical cancer screening. 3.  Obesity/general health maintenance:  -The patient was given the contact information to Dr. Jazz Kapoor office. I encouraged her to schedule an appointment to discuss surveillance options given her high risk for breast cancer for her risk factors.  -I also encouraged her to limit her processed food intake. I encouraged her to work on getting her weight down. We discussed the importance of dietary changes. I instructed her to limit all foods with sugar or the word enriched in the first 5 ingredients. I encouraged her to maintain a heart healthy diet but is high in fiber.  -I am ordering follow-up labs a week before her follow-up appointment in a year. We will check a: CMP, CBC, CRP, hepatitis C screening, a lipid panel, and an A1c. Health Maintenance Due   Topic Date Due    Hepatitis C Screening  1965    FOBT Q 1 YEAR AGE 50-75  08/23/2013    PAP AKA CERVICAL CYTOLOGY  05/10/2016    DTaP/Tdap/Td series (2 - Td) 01/01/2018     Lab review: labs are reviewed in the EHR    I have discussed the diagnosis with the patient and the intended plan as seen in the above orders. The patient has received an after-visit summary and questions were answered concerning future plans. I have discussed medication side effects and warnings with the patient as well.  I have reviewed the plan of care with the patient, accepted their input and they are in agreement with the treatment goals. All questions were answered. The patient understands the plan of care. Handouts provided today with above information. Pt instructed if symptoms worsen to call the office or report to the ED for continued care. Greater than 50% of the visit time was spent in counseling and/or coordination of care. Follow-up Disposition:  Return in about 1 year (around 3/20/2019) for BP check, autoimmune hepatitis.     Maranda Chance MD

## 2018-03-20 NOTE — PROGRESS NOTES
1. Have you been to the ER, urgent care clinic since your last visit? Hospitalized since your last visit? No    2. Have you seen or consulted any other health care providers outside of the [address] [place] [address] since your last visit? Include any pap smears or colon screening.  No

## 2018-03-20 NOTE — PATIENT INSTRUCTIONS
MRI of the Breast: About This Test  What is it? MRI (magnetic resonance imaging) is a test that uses a magnetic field and pulses of radio wave energy to make pictures of the organs and structures inside the body. An MRI can give your doctor information about your breasts, chest wall, and underarm. When you have an MRI, you lie on a table and the table moves into the MRI machine. Why is this test done? An MRI of the breast can help find breast cancer and how far along it is (its stage). It can also look for infection. How can you prepare for the test?  Talk to your doctor about all your health conditions before the test. For example, tell your doctor if:  · You are allergic to any medicines. · You are or might be pregnant. · You have a pacemaker, an artificial limb, any metal pins or metal parts in your body, metal heart valves, metal clips in your brain, metal implants in your ears, or any other implanted or prosthetic medical device. · You have an intrauterine device (IUD) in place. · You get nervous in confined spaces. You may need medicine to help you relax. · You wear a patch that contains medicine. · You have kidney disease. What happens before the test?  · You will remove all metal objects, such as hearing aids, dentures, jewelry, watches, and hairpins. · You will need to take off your clothes above the waist. You will be given a gown to cover your shoulders during the test. Make sure you take everything out of your pockets. · You will probably have contrast material (dye) put into your arm through a tube called an IV. Contrast material helps doctors see specific organs, blood vessels, and most tumors. What happens during the test?  · You will lie on your stomach on a table that is part of the MRI scanner. Straps may be used to help keep your body in the best position. · The table will slide into the space that contains the magnet.  A device called a coil will be placed over or wrapped around the breast area. · Inside the scanner you will hear a fan and feel air moving. You may hear tapping, thumping, or snapping noises. You may be given earplugs or headphones to reduce the noise. · You will be asked to hold still during the scan. You may be asked to hold your breath for short periods. · You may be alone in the scanning room, but a technologist will be watching you through a window and talking with you during the test.  What else should you know about the test?  · An MRI does not hurt. · If a dye is used, you may feel a quick sting or pinch and some coolness when the IV is started. The dye may give you a metallic taste in your mouth. Some people feel sick to their stomach or get a headache. · If you breastfeed and are concerned about whether the dye used in this test is safe, talk to your doctor. Most experts believe that very little dye passes into breast milk and even less is passed on to the baby. But if you prefer, you can store some of your breast milk ahead of time and use it for a day or two after the test.  · You may feel warmth in the area being examined. This is normal.  How long does the test take? · The test usually takes 30 to 60 minutes but can take as long as 2 hours. What happens after the test?  · You will probably be able to go home right away, depending on the reason for the test.  · You can go back to your usual activities right away. Follow-up care is a key part of your treatment and safety. Be sure to make and go to all appointments, and call your doctor if you are having problems. It's also a good idea to keep a list of the medicines you take. Ask your doctor when you can expect to have your test results. Where can you learn more? Go to http://tom-javi.info/. Enter Q234 in the search box to learn more about \"MRI of the Breast: About This Test.\"  Current as of: October 14, 2016  Content Version: 11.4  © 5737-6716 Healthwise, [employer]. Care instructions adapted under license by [employer] (which disclaims liability or warranty for this information). If you have questions about a medical condition or this instruction, always ask your healthcare professional. [address] any warranty or liability for your use of this information.

## 2018-04-25 DIAGNOSIS — K75.4 AUTOIMMUNE HEPATITIS (HCC): ICD-10-CM

## 2018-04-25 DIAGNOSIS — Z13.0 SCREENING FOR DEFICIENCY ANEMIA: ICD-10-CM

## 2018-04-25 DIAGNOSIS — Z13.220 SCREENING FOR HYPERLIPIDEMIA: ICD-10-CM

## 2018-04-25 DIAGNOSIS — R21 RASH: ICD-10-CM

## 2018-04-25 DIAGNOSIS — E66.9 OBESITY (BMI 30-39.9): ICD-10-CM

## 2018-06-26 ENCOUNTER — OFFICE VISIT (OUTPATIENT)
Dept: HEMATOLOGY | Age: 53
End: 2018-06-26

## 2018-06-26 VITALS
TEMPERATURE: 98.3 F | OXYGEN SATURATION: 92 % | BODY MASS INDEX: 30.05 KG/M2 | DIASTOLIC BLOOD PRESSURE: 99 MMHG | HEART RATE: 79 BPM | RESPIRATION RATE: 16 BRPM | SYSTOLIC BLOOD PRESSURE: 166 MMHG | HEIGHT: 64 IN | WEIGHT: 176 LBS

## 2018-06-26 DIAGNOSIS — K75.4 AUTOIMMUNE HEPATITIS (HCC): Primary | ICD-10-CM

## 2018-06-26 NOTE — MR AVS SNAPSHOT
[address] [id number] 
[phone] Patient: Anitra Lefort MRN: [id number] :1965 Visit Information Date & Time Provider Department Dept. Phone Encounter #  
 2018  8:30 AM DILEEP [place] [address] [id number] Follow-up Instructions Return in about 6 months (around 2018). Your Appointments 3/15/2019  7:55 AM  
LAB with [facility] NURSE VISIT [facility] of [address] [place] [address] ([facility] Appt Note: labs [address], [address] [place] [id number] [place] [address] [address] [place]  
  
   
 [address], [address]  
  
    
 3/22/2019  8:30 AM  
Office Visit with Kai Strickland MD  
[facility] of [address] [place] [address] [facility] Appt Note: 1 yr. f/u  
 [address] [address] [address] [place]  
  
   
 [address], [address] Upcoming Health Maintenance Date Due Hepatitis C Screening 1965 FOBT Q 1 YEAR AGE 50-75 2013 PAP AKA CERVICAL CYTOLOGY 5/10/2016 DTaP/Tdap/Td series (2 - Td) 2018 Influenza Age 5 to Adult 2018 BREAST CANCER SCRN MAMMOGRAM 2020 Allergies as of 2018  Review Complete On: 2018 By: David Romeo Severity Noted Reaction Type Reactions Bactrim [Sulfamethoprim Ds]  2014    Other (comments) Current Immunizations  Never Reviewed Name Date Tdap 2008 Not reviewed this visit You Were Diagnosed With   
  
 Codes Comments Autoimmune hepatitis ([facility] [address]    -  Primary ICD-10-CM: K75.4 ICD-9-CM: 571.42 Vitals BP Pulse Temp Resp Height(growth percentile) (!) 166/99 (BP 1 Location: Right arm, BP Patient Position: Sitting) 79 98.3 °F (36.8 °C) (Tympanic) 16 5' 3.5\" (1.613 m) Weight(growth percentile) SpO2 BMI OB Status Smoking Status 176 lb (79.8 kg) 92% 30.69 kg/m2 Postmenopausal Never Smoker BMI and BSA Data Body Mass Index Body Surface Area  
 30.69 kg/m 2 1.89 m 2 Preferred Pharmacy Pharmacy Name Phone [address] [phone] Your Updated Medication List  
  
   
This list is accurate as of 6/26/18  9:09 AM.  Always use your most recent med list.  
  
  
  
  
 DAILY MULTI-VITAMINS/IRON tablet Generic drug:  multivitamin with iron Take 1 Tab by mouth daily. ferrous fumarate 324 mg (106 mg iron) Tab Take  by mouth.  
  
 ketoconazole 2 % topical cream  
Commonly known as:  NIZORAL Apply  to affected area daily. mycophenolate 500 mg tablet Commonly known as:  CELLCEPT  
TAKE 1 TABLET BY MOUTH TWICE DAILY  
  
 VITAMIN D3 1,000 unit Cap Generic drug:  cholecalciferol Take  by mouth daily. Follow-up Instructions Return in about 6 months (around 12/26/2018). To-Do List   
 06/26/2018 Lab:  CBC WITH AUTOMATED DIFF   
  
 06/26/2018 Lab:  HEPATIC FUNCTION PANEL   
  
 06/26/2018 Lab:  LIPID PANEL   
  
 06/26/2018 Lab:  METABOLIC PANEL, BASIC   
  
 06/26/2018 Lab:  VITAMIN D, 1, 25 DIHYDROXY Introducing [facility]! Dear Steph Hutchison: Thank you for requesting a Pixelpipe account. Our records indicate that you already have an active Pixelpipe account. You can access your account anytime at [email or web address] Did you know that you can access your hospital and ER discharge instructions at any time in Pixelpipe? You can also review all of your test results from your hospital stay or ER visit. Additional Information If you have questions, please visit the Frequently Asked Questions section of the Pixelpipe website at [email or web address] Remember, [employer] is NOT to be used for urgent needs. For medical emergencies, dial 911. Now available from your iPhone and Android! Please provide this summary of care documentation to your next provider. Your primary care clinician is listed as Casey Casas. If you have any questions after today's visit, please call [phone].

## 2018-06-26 NOTE — PROGRESS NOTES
[address] Santos MD, [place], [facility] [place], [place]       Patricia Nichols, KIRSTEN [place], [facility]   Ann Davis, DILEEP Small NP        at [address], [address] [address]     [phone]     FAX: [phone]    at [address], [address] [facility], [address]     [phone]     FAX: [phone]       Problem List  Date Reviewed: 6/26/2018          Codes Class Noted    Obesity (BMI 30-39. 9) ICD-10-CM: E66.9  ICD-9-CM: 278.00  2/18/2018        History of abnormal cervical Pap smear ICD-10-CM: Z87.898  ICD-9-CM: V13.29  2/2/2018    Overview Signed 2/2/2018  7:44 AM by Eagle Anderson MD     S/p NIKHIL in the 1980s             Autoimmune hepatitis [facility]) ICD-10-CM: K75.4  ICD-9-CM: 571.42  Unknown              Ms. Salvadore Cabot returns to the [facility] regarding autoimmune hepatitis and it management. Her active problem list, all pertinent past medical history, medications, liver histology, endoscopic studies, radiologic findings and laboratory findings related to the liver disorder were reviewed with her. Edi Morris had a liver biopsy in November 2010 that was consistent with autoimmune hepatitis in the setting of portal fibrosis. Ms. Salvadore Cabot is currently being treated with Cellcept 500 mg daily. The CellCept was ordered at 500 mg bid, but she self adjusted the dose without informing this office. She is tolerating treatment well. Ms. Diaz White has not had any infections since her last office visit. Her transaminases have normalized. The patient has no complaints which can be attributed to liver disease. The patient completes all daily activities without any functional limitations.  The patient has not experienced fatigue, fevers, chills, shortness of breath, chest pain, pain in the right side over the liver, diffuse abdominal pain, nausea, vomiting, constipation, diarrhrea, dry eyes, dry mouth, arthralgias, myalgias, yellowing of the eyes or skin, itching, dark urine, problems concentrating, swelling of the abdomen, swelling of the lower extremities, hematemesis, or hematochezia. Allergies:  No known allergies. Current Outpatient Prescriptions   Medication Sig Dispense Refill    multivitamin with iron (DAILY MULTI-VITAMINS/IRON) tablet Take 1 Tab by mouth daily.  ferrous fumarate 324 mg (106 mg iron) tab Take  by mouth.  cholecalciferol (VITAMIN D3) 1,000 unit cap Take  by mouth daily.  ketoconazole (NIZORAL) 2 % topical cream Apply  to affected area daily. 60 g 11    mycophenolate (CELLCEPT) 500 mg tablet TAKE 1 TABLET BY MOUTH TWICE DAILY (Patient taking differently: TAKE 1 TABLET BY MOUTH daily) 60 Tab 10       SYSTEM REVIEW NOT RELATED TO LIVER DISEASE OR REVIEWED ABOVE:  Constitution systems: Negative for fever, chills, weight gain, weight loss. Eyes: Negative for visual changes. ENT: Negative for sore throat, painful swallowing. Respiratory: Negative for cough, hemoptysis, SOB. Cardiology: Negative for chest pain, palpitations. GI:  Negative for constipation or diarrhea. : Negative for urinary frequency, dysuria, hematuria, nocturia. Skin: Negative for rash. Hematology: Negative for easy bruising, blood clots. Musculo-skelatal: Negative for back pain, muscle pain, weakness. Neurologic: Negative for headaches, dizziness, vertigo, memory problems not related to HE. Psychology: Negative for anxiety, depression. Family/Social History:  She is single with no children. She does not smoke tobacco.  She consumes alcohol rarely. She works full time as an .       Physical Examination:    Visit Vitals    BP (!) 166/99 (BP 1 Location: Right arm, BP Patient Position: Sitting)    Pulse 79    Temp 98.3 °F (36.8 °C) (Tympanic)    Resp 16    Ht 5' 3.5\" (1.613 m)    Wt 176 lb (79.8 kg)    SpO2 92%    BMI 30.69 kg/m2       General:  No acute distress. Eyes:  Sclera anicteric. ENT:  No oral lesions, thyroid normal.  Nodes:  No adenopathy. Skin:  No spider angiomata, jaundice, palmar erythema or xanthlasma. Respiratory:  Lungs clear to auscultation. Cardiovascular:  Regular heart rate, no murmurs, no edema, no JVD. Abdomen:  Soft non-tender, liver size normal to percussion/palpation. Spleen not palpable. No obvious ascites. Extremities:  No muscle wasting, no gross arthritic changes. Neurologic:  Alert and oriented, cranial nerves grossly intact, no asterixsis. Laboratory Studies:  From 04/2018;  AST/ ALT/ ALP/ T. BILI/ ALB: 29/ 28/ 97/ 0.4/ 3.9  BUN/ CRT:  15/ 0.92    From 05/2016:  AST/ ALT/ ALP/ T. BILI/ ALB: 23/ 22/ 107/ 0.3/ 4.0      Serologies and Other Pertinent Laboratories:  12/10:  RONAK positive, ASMA positive, ANCA negative, rheumatoid factor positive, LKM negative, SLA negative. 10/2015:  HAV antibody negative, HBV antibody negative    Liver Biopsy:  11/10:  Mild hepatitis with mild piecemeal necrosis and portal fibrosis. Biopsy slides need to be reviewed by MLS. Endoscopic Procedures:  None available. Radiology:  02/09:  Ultrasound liver - echogenic liver consistent with chronic liver disease, no liver mass lesions, no ascites. 09/10:  Ultrasound liver - echogenic liver consistent with chronic liver disease, no liver mass lesions, no ascites. Other Testing:  None available. Assessment and Plan:  Autoimmune hepatitis. The most recent laboratory studies indicate that the liver transaminases are normal, alkaline phosphatase is normal, tests of hepatic synthetic and metabolic function are normal, and the platelet count is normal.   Will perform laboratory testing to monitor liver function and degree of liver injury. This will include hepatic panel, a CBC w/ diff, and a BMP.      Autoimmune hepatitis is being treated with 500 mg CellCept daily. This was ordered as 500 mg bid, but the patient reduced itto 500 mg daily without informing this office. Will follow with today's labs and see if the dose needs to be adjusted back up. Vaccination for viral hepatitis A and B is recommended since the patient has no serologic evidence of previous exposure or vaccination with immunity. Alcohol counseling provided. [facility] 87 in six months.         Mitul Hernandez NP   Liver [place] of [address] [place] [address], [address] [place] [address] [place], [address] [place] [address]   [phone]

## 2018-06-26 NOTE — PROGRESS NOTES
Dheeraj Majano is a 45 y.o. female      1. Have you been to the ER, urgent care clinic or hospitalized since your last visit? NO.     2. Have you seen or consulted any other health care providers outside of the [address] [place] [address] since your last visit (Include any pap smears or colon screening)?  NO          Learning Assessment 2/16/2018   PRIMARY LEARNER Patient   HIGHEST LEVEL OF EDUCATION - PRIMARY LEARNER  GRADUATED HIGH SCHOOL OR GED   BARRIERS PRIMARY LEARNER NONE   CO-LEARNER CAREGIVER No   PRIMARY LANGUAGE ENGLISH   LEARNER PREFERENCE PRIMARY DEMONSTRATION   ANSWERED BY patient   RELATIONSHIP SELF

## 2018-06-27 LAB
25(OH)D3 SERPL-MCNC: 38 NG/ML (ref 30–100)
ABSOLUTE BANDS, 67058: ABNORMAL
ABSOLUTE BLASTS: ABNORMAL
ABSOLUTE METAMYELOCYTES, 900360: ABNORMAL
ABSOLUTE MYELOCYTES: ABNORMAL
ABSOLUTE NRBC,ANRBC: ABNORMAL
ABSOLUTE PROMYELOCYTES: ABNORMAL
ALB/GLOBRATIO, 58C: 1.1 (CALC) (ref 1–2.5)
ALBUMIN SERPL-MCNC: 3.7 G/DL (ref 3.6–5.1)
ALP SERPL-CCNC: 95 U/L (ref 33–130)
ALT SERPL-CCNC: 24 U/L (ref 6–29)
AST SERPL W P-5'-P-CCNC: 22 U/L (ref 10–35)
BANDS,BANDS: ABNORMAL
BASOPHILS # BLD: 52 CELLS/UL (ref 0–200)
BASOPHILS NFR BLD: 0.7 %
BILIRUB SERPL-MCNC: 0.4 MG/DL (ref 0.2–1.2)
BILIRUBIN, DIRECT,CBIL: 0.1 MG/DL (ref 0–0.2)
BILIRUBIN, INDIRECT,UBIL: 0.3 MG/DL (CALC) (ref 0.2–1.2)
BLASTS,BLAST: ABNORMAL
BUN SERPL-MCNC: 15 MG/DL (ref 7–25)
BUN/CREATININE RATIO,BUCR: NORMAL (CALC) (ref 6–22)
CALCIUM SERPL-MCNC: 9.4 MG/DL (ref 8.6–10.4)
CHLORIDE SERPL-SCNC: 107 MMOL/L (ref 98–110)
CHOL/HDL RATIO,CHHDX: 4 (CALC)
CHOLEST SERPL-MCNC: 172 MG/DL
CO2 SERPL-SCNC: 31 MMOL/L (ref 20–31)
COMMENT(S): ABNORMAL
CREAT SERPL-MCNC: 0.85 MG/DL (ref 0.5–1.05)
EOSINOPHIL # BLD: 133 CELLS/UL (ref 15–500)
EOSINOPHIL NFR BLD: 1.8 %
ERYTHROCYTE [DISTWIDTH] IN BLOOD BY AUTOMATED COUNT: 15.3 % (ref 11–15)
GLOBULIN,GLOB: 3.4 G/DL (CALC) (ref 1.9–3.7)
GLUCOSE SERPL-MCNC: 90 MG/DL (ref 65–99)
HCT VFR BLD AUTO: 41.4 % (ref 35–45)
HDLC SERPL-MCNC: 43 MG/DL
HGB BLD-MCNC: 12.8 G/DL (ref 11.7–15.5)
LDL-CHOLESTEROL: 107 MG/DL (CALC)
LYMPHOCYTES # BLD: 2346 CELLS/UL (ref 850–3900)
LYMPHOCYTES NFR BLD: 31.7 %
MCH RBC QN AUTO: 23.6 PG (ref 27–33)
MCHC RBC AUTO-ENTMCNC: 30.9 G/DL (ref 32–36)
MCV RBC AUTO: 76.4 FL (ref 80–100)
METAMYELOCYTES,METAS: ABNORMAL
MONOCYTES # BLD: 777 CELLS/UL (ref 200–950)
MONOCYTES NFR BLD: 10.5 %
MYELOCYTES,MYELO: ABNORMAL
NEUTROPHILS # BLD AUTO: 4092 CELLS/UL (ref 1500–7800)
NEUTROPHILS # BLD: 55.3 %
NON-HDL CHOLESTEROL, 011976: 129 MG/DL (CALC)
NRBC: ABNORMAL
PLATELET # BLD AUTO: 248 THOUSAND/UL (ref 140–400)
PMV BLD AUTO: 11.9 FL (ref 7.5–12.5)
POTASSIUM SERPL-SCNC: 4.6 MMOL/L (ref 3.5–5.3)
PROMYELOCYTES,PRO: ABNORMAL
PROT SERPL-MCNC: 7.1 G/DL (ref 6.1–8.1)
RBC # BLD AUTO: 5.42 MILLION/UL (ref 3.8–5.1)
REACTIVE LYMPHS: ABNORMAL
SODIUM SERPL-SCNC: 139 MMOL/L (ref 135–146)
TRIGL SERPL-MCNC: 128 MG/DL (ref ?–150)
WBC # BLD AUTO: 7.4 THOUSAND/UL (ref 3.8–10.8)

## 2018-07-16 RX ORDER — MYCOPHENOLATE MOFETIL 500 MG/1
TABLET ORAL
Qty: 60 TAB | Refills: 10 | Status: SHIPPED | OUTPATIENT
Start: 2018-07-16 | End: 2019-01-08

## 2018-07-16 RX ORDER — MYCOPHENOLATE MOFETIL 500 MG/1
TABLET ORAL
Qty: 60 TAB | Refills: 10 | Status: SHIPPED | OUTPATIENT
Start: 2018-07-16 | End: 2019-10-09 | Stop reason: SDUPTHER

## 2018-11-13 ENCOUNTER — TELEPHONE (OUTPATIENT)
Dept: HEMATOLOGY | Age: 53
End: 2018-11-13

## 2018-11-13 NOTE — TELEPHONE ENCOUNTER
Patient states on her last visit, she was told to have [address] done.     Please place order if necessary so I can contact pt with info

## 2019-01-08 ENCOUNTER — OFFICE VISIT (OUTPATIENT)
Dept: HEMATOLOGY | Age: 54
End: 2019-01-08

## 2019-01-08 VITALS
RESPIRATION RATE: 16 BRPM | HEIGHT: 64 IN | BODY MASS INDEX: 30.56 KG/M2 | WEIGHT: 179 LBS | TEMPERATURE: 97.8 F | OXYGEN SATURATION: 96 % | SYSTOLIC BLOOD PRESSURE: 178 MMHG | HEART RATE: 86 BPM | DIASTOLIC BLOOD PRESSURE: 85 MMHG

## 2019-01-08 DIAGNOSIS — K75.4 AUTOIMMUNE HEPATITIS (HCC): Primary | ICD-10-CM

## 2019-01-08 NOTE — PROGRESS NOTES
[address] Santos MD, [address], [facility] [place]       Shaheen Rodriguez, KIRSTEN Guillaume ACNP-DILEEP Rodriguez NP        at [address], [address] [address]     [phone]     FAX: [phone]    at [address], [address] [facility], [address]     [phone]     FAX: [phone]       Problem List  Date Reviewed: 6/26/2018          Codes Class Noted    Obesity (BMI 30-39. 9) ICD-10-CM: E66.9  ICD-9-CM: 278.00  2/18/2018        History of abnormal cervical Pap smear ICD-10-CM: Z87.898  ICD-9-CM: V13.29  2/2/2018    Overview Signed 2/2/2018  7:44 AM by Bertha Kaplan MD     S/p NIKHIL in the 1980s             Autoimmune hepatitis [facility]) ICD-10-CM: K75.4  ICD-9-CM: 571.42  Unknown              Ms. Carissa Davila returns to the [address] regarding autoimmune hepatitis and it management. Her active problem list, all pertinent past medical history, medications, liver histology, endoscopic studies, radiologic findings and laboratory findings related to the liver disorder were reviewed with her. Presley Kim had a liver biopsy in November 2010 that was consistent with autoimmune hepatitis in the setting of portal fibrosis. Ms. Carissa Davila is currently being treated with Cellcept 500 mg daily. The CellCept was ordered at 500 mg bid, but she self adjusted the dose without informing this office. She is tolerating treatment well. Enzymes remain normal.     Ms. Bouchra Vallecillo has not had any infections since her last office visit. The patient has no complaints which can be attributed to liver disease. The patient completes all daily activities without any functional limitations.  The patient has not experienced fatigue, fevers, chills, shortness of breath, chest pain, pain in the right side over the liver, diffuse abdominal pain, nausea, vomiting, constipation, diarrhrea, dry eyes, dry mouth, arthralgias, myalgias, yellowing of the eyes or skin, itching, dark urine, problems concentrating, swelling of the abdomen, swelling of the lower extremities, hematemesis, or hematochezia. Allergies:  No known allergies. Current Outpatient Medications   Medication Sig Dispense Refill    mycophenolate (CELLCEPT) 500 mg tablet TAKE 1 TABLET BY MOUTH TWICE DAILY (Patient taking differently: Take 500 mg by mouth daily. TAKE 1 TABLET BY MOUTH TWICE DAILY) 60 Tab 10    multivitamin with iron (DAILY MULTI-VITAMINS/IRON) tablet Take 1 Tab by mouth daily.  ferrous fumarate 324 mg (106 mg iron) tab Take  by mouth.  cholecalciferol (VITAMIN D3) 1,000 unit cap Take  by mouth daily.  ketoconazole (NIZORAL) 2 % topical cream Apply  to affected area daily. 60 g 11       SYSTEM REVIEW NOT RELATED TO LIVER DISEASE OR REVIEWED ABOVE:  Constitution systems: Negative for fever, chills, weight gain, weight loss. Eyes: Negative for visual changes. ENT: Negative for sore throat, painful swallowing. Respiratory: Negative for cough, hemoptysis, SOB. Cardiology: Negative for chest pain, palpitations. GI:  Negative for constipation or diarrhea. : Negative for urinary frequency, dysuria, hematuria, nocturia. Skin: Negative for rash. Hematology: Negative for easy bruising, blood clots. Musculo-skelatal: Negative for back pain, muscle pain, weakness. Neurologic: Negative for headaches, dizziness, vertigo, memory problems not related to HE. Psychology: Negative for anxiety, depression. Family/Social History:  She is single with no children. She does not smoke tobacco.  She consumes alcohol rarely. She works full time as an .       Physical Examination:    Visit Vitals  /85 (BP 1 Location: Left arm, BP Patient Position: Sitting)   Pulse 86   Temp 97.8 °F (36.6 °C) (Tympanic)   Resp 16   Ht 5' 3.5\" (1.613 m)   Wt 179 lb (81.2 kg)   SpO2 96%   BMI 31.21 kg/m²       General:  No acute distress. Eyes:  Sclera anicteric. ENT:  No oral lesions, thyroid normal.  Nodes:  No adenopathy. Skin:  No spider angiomata, jaundice, palmar erythema or xanthlasma. Respiratory:  Lungs clear to auscultation. Cardiovascular:  Regular heart rate, no murmurs, no edema, no JVD. Abdomen:  Soft non-tender, liver size normal to percussion/palpation. Spleen not palpable. No obvious ascites. Extremities:  No muscle wasting, no gross arthritic changes. Neurologic:  Alert and oriented, cranial nerves grossly intact, no asterixsis. Laboratory Studies:  Liver [place] of [address] Units 6/26/2018   WBC 3.8 - 10.8 Thousand/uL 7.4   ANC 1,500 - 7,800 cells/uL 4,092   HGB 11.7 - 15.5 g/dL 12.8    - 400 Thousand/uL 248   AST 10 - 35 U/L 22   ALT 6 - 29 U/L 24   Alk Phos 33 - 130 U/L 95   Bili, Total 0.2 - 1.2 mg/dL 0.4   Bili, Direct 0.0 - 0.2 mg/dL 0.1   Albumin 3.6 - 5.1 g/dL 3.7   BUN 7 - 25 mg/dL 15   Creat 0.50 - 1.05 mg/dL 0.85   Na 135 - 146 mmol/L 139   K 3.5 - 5.3 mmol/L 4.6   Cl 98 - 110 mmol/L 107   CO2 20 - 31 mmol/L 31   Glucose 65 - 99 mg/dL 90     From 04/2018;  AST/ ALT/ ALP/ T. BILI/ ALB: 29/ 28/ 97/ 0.4/ 3.9  BUN/ CRT:  15/ 0.92    Serologies and Other Pertinent Laboratories:  12/10:  RONAK positive, ASMA positive, ANCA negative, rheumatoid factor positive, LKM negative, SLA negative. 10/2015:  HAV antibody negative, HBV antibody negative    Liver Biopsy:  11/10:  Mild hepatitis with mild piecemeal necrosis and portal fibrosis. Biopsy slides need to be reviewed by MLS. Endoscopic Procedures:  None available. Radiology:  02/09:  Ultrasound liver - echogenic liver consistent with chronic liver disease, no liver mass lesions, no ascites. 09/10:  Ultrasound liver - echogenic liver consistent with chronic liver disease, no liver mass lesions, no ascites.       Other Testing:  None available. Assessment and Plan:  Autoimmune hepatitis. The most recent laboratory studies indicate that the liver transaminases are normal, alkaline phosphatase is normal, tests of hepatic synthetic and metabolic function are normal, and the platelet count is normal.   Will perform laboratory testing to monitor liver function and degree of liver injury. This will include hepatic panel, a CBC w/ diff, and a BMP. Autoimmune hepatitis is being treated with 500 mg CellCept daily. This was ordered as 500 mg bid, but the patient reduced itto 500 mg daily without informing this office. Will follow with today's labs and see if the dose needs to be adjusted back up. The need to perform an assessment of liver fibrosis was discussed with the patient. Elastography  can assess liver fibrosis and determine if a patient has advanced fibrosis or cirrhosis without the need for liver biopsy. This can also be performed with ultrasound. This was ordered today. Vaccination for viral hepatitis A and B is recommended since the patient has no serologic evidence of previous exposure or vaccination with immunity. Alcohol counseling provided. [facility] 87 in six months.         Charity Katz NP   Liver [place] of [address] [place] [address], [address] [place] [address] Thierno Pebbles [place], [facility]   [phone]

## 2019-01-08 NOTE — PROGRESS NOTES
Ronaldo Zavala is a 52 y.o. female      1. Have you been to the ER, urgent care clinic or hospitalized since your last visit? NO.     2. Have you seen or consulted any other health care providers outside of the [address] [place] [address] since your last visit (Include any pap smears or colon screening)?  NO          Learning Assessment 2/16/2018   PRIMARY LEARNER Patient   HIGHEST LEVEL OF EDUCATION - PRIMARY LEARNER  GRADUATED HIGH SCHOOL OR GED   BARRIERS PRIMARY LEARNER NONE   CO-LEARNER CAREGIVER No   PRIMARY LANGUAGE ENGLISH   LEARNER PREFERENCE PRIMARY DEMONSTRATION   ANSWERED BY patient   RELATIONSHIP SELF

## 2019-02-09 LAB
ABSOLUTE BANDS, 67058: ABNORMAL
ABSOLUTE BLASTS: ABNORMAL
ABSOLUTE METAMYELOCYTES, 900360: ABNORMAL
ABSOLUTE MYELOCYTES: ABNORMAL
ABSOLUTE NRBC,ANRBC: ABNORMAL
ABSOLUTE PROMYELOCYTES: ABNORMAL
ALB/GLOBRATIO, 58C: 1.2 (CALC) (ref 1–2.5)
ALBUMIN SERPL-MCNC: 3.8 G/DL (ref 3.6–5.1)
ALP SERPL-CCNC: 100 U/L (ref 33–130)
ALT SERPL-CCNC: 24 U/L (ref 6–29)
AST SERPL W P-5'-P-CCNC: 21 U/L (ref 10–35)
BANDS,BANDS: ABNORMAL
BASOPHILS # BLD: 50 CELLS/UL (ref 0–200)
BASOPHILS NFR BLD: 0.7 %
BILIRUB SERPL-MCNC: 0.5 MG/DL (ref 0.2–1.2)
BILIRUBIN, DIRECT,CBIL: 0.1 MG/DL (ref 0–0.2)
BILIRUBIN, INDIRECT,UBIL: 0.4 MG/DL (CALC) (ref 0.2–1.2)
BLASTS,BLAST: ABNORMAL
BUN SERPL-MCNC: 15 MG/DL (ref 7–25)
BUN/CREATININE RATIO,BUCR: NORMAL (CALC) (ref 6–22)
CALCIUM SERPL-MCNC: 9.4 MG/DL (ref 8.6–10.4)
CHLORIDE SERPL-SCNC: 105 MMOL/L (ref 98–110)
CO2 SERPL-SCNC: 25 MMOL/L (ref 20–32)
COMMENT(S): ABNORMAL
CREAT SERPL-MCNC: 0.84 MG/DL (ref 0.5–1.05)
EOSINOPHIL # BLD: 121 CELLS/UL (ref 15–500)
EOSINOPHIL NFR BLD: 1.7 %
ERYTHROCYTE [DISTWIDTH] IN BLOOD BY AUTOMATED COUNT: 14.9 % (ref 11–15)
GLOBULIN,GLOB: 3.3 G/DL (CALC) (ref 1.9–3.7)
GLUCOSE SERPL-MCNC: 93 MG/DL (ref 65–99)
HCT VFR BLD AUTO: 39.8 % (ref 35–45)
HGB BLD-MCNC: 12.6 G/DL (ref 11.7–15.5)
LYMPHOCYTES # BLD: 2102 CELLS/UL (ref 850–3900)
LYMPHOCYTES NFR BLD: 29.6 %
MCH RBC QN AUTO: 24.1 PG (ref 27–33)
MCHC RBC AUTO-ENTMCNC: 31.7 G/DL (ref 32–36)
MCV RBC AUTO: 76.2 FL (ref 80–100)
METAMYELOCYTES,METAS: ABNORMAL
MONOCYTES # BLD: 760 CELLS/UL (ref 200–950)
MONOCYTES NFR BLD: 10.7 %
MYELOCYTES,MYELO: ABNORMAL
NEUTROPHILS # BLD AUTO: 4068 CELLS/UL (ref 1500–7800)
NEUTROPHILS # BLD: 57.3 %
NRBC: ABNORMAL
PLATELET # BLD AUTO: 253 THOUSAND/UL (ref 140–400)
PMV BLD AUTO: 11.8 FL (ref 7.5–12.5)
POTASSIUM SERPL-SCNC: 4.3 MMOL/L (ref 3.5–5.3)
PROMYELOCYTES,PRO: ABNORMAL
PROT SERPL-MCNC: 7.1 G/DL (ref 6.1–8.1)
RBC # BLD AUTO: 5.22 MILLION/UL (ref 3.8–5.1)
REACTIVE LYMPHS: ABNORMAL
SODIUM SERPL-SCNC: 140 MMOL/L (ref 135–146)
WBC # BLD AUTO: 7.1 THOUSAND/UL (ref 3.8–10.8)

## 2019-10-09 ENCOUNTER — OFFICE VISIT (OUTPATIENT)
Dept: HEMATOLOGY | Age: 54
End: 2019-10-09

## 2019-10-09 VITALS
HEIGHT: 63 IN | BODY MASS INDEX: 30.83 KG/M2 | TEMPERATURE: 97.8 F | WEIGHT: 174 LBS | DIASTOLIC BLOOD PRESSURE: 99 MMHG | SYSTOLIC BLOOD PRESSURE: 178 MMHG | HEART RATE: 85 BPM | OXYGEN SATURATION: 96 %

## 2019-10-09 DIAGNOSIS — K75.4 AUTOIMMUNE HEPATITIS (HCC): Primary | ICD-10-CM

## 2019-10-09 RX ORDER — MYCOPHENOLATE MOFETIL 500 MG/1
TABLET ORAL
Qty: 180 TAB | Refills: 3 | Status: SHIPPED | OUTPATIENT
Start: 2019-10-09

## 2020-09-28 ENCOUNTER — VIRTUAL VISIT (OUTPATIENT)
Dept: HEMATOLOGY | Age: 55
End: 2020-09-28
Payer: COMMERCIAL

## 2020-09-28 DIAGNOSIS — K75.4 AUTOIMMUNE HEPATITIS (HCC): Primary | ICD-10-CM

## 2020-09-28 PROCEDURE — 99213 OFFICE O/P EST LOW 20 MIN: CPT | Performed by: NURSE PRACTITIONER

## 2020-09-28 NOTE — PROGRESS NOTES
VIRTUAL TELEHEALTH VISIT PERFORMED DUE TO COVID-19 EPIDEMIC    CONSENT:  Seamus Voss, who was seen by synchronous, real-time, audio-video technology, and/or her healthcare decision maker, is aware that this patient-initiated, Telehealth encounter on 9/28/2020 is a billable service, with coverage as determined by her insurance carrier. She is aware that she may receive a bill and has provided verbal consent to proceed. This patient was evaluated during a Virtual Telehealth visit. A caregiver was present if appropriate. Due to this being a TeleHealth encounter performed during the RNQON-69 public health emergency, the physical examination was limited to that listed in the [address] [facility] [address], [place], Shaji, Harsh Noland, MD Forest Wiseman, PAJAYLEN Delarosa, ACNP-BC     April S Simone, AGNP-BC   Gage Chandler, y [address] [facility]-BC       [facility] [address]    at [address] 22.    [phone]    FAX: [phone]    at [address], [address] [place] [address], [address]    [phone]    FAX: [phone]           Problem List  Date Reviewed: 6/26/2018          Codes Class Noted    Obesity (BMI 30-39. 9) ICD-10-CM: E66.9  ICD-9-CM: 278.00  2/18/2018        History of abnormal cervical Pap smear ICD-10-CM: Z87.42  ICD-9-CM: V13.29  2/2/2018    Overview Signed 2/2/2018  7:44 AM by Gwendolyn Breaux MD     S/p NIKHIL in the 1980s             Autoimmune hepatitis [facility]) ICD-10-CM: K75.4  ICD-9-CM: 571.42  Unknown              Ms. Jennifer Salguero returns to the [address] regarding autoimmune hepatitis and it management.      Her active problem list, all pertinent past medical history, medications, liver histology, endoscopic studies, radiologic findings and laboratory findings related to the liver disorder were reviewed with her. Mara Christensen had a liver biopsy in November 2010 that was consistent with autoimmune hepatitis in the setting of portal fibrosis. Ms. Ned Wright is currently being treated with Cellcept 500 mg daily. The CellCept was ordered at 500 mg bid, but she self adjusted the dose without informing this office. She is tolerating treatment well. Enzymes remain normal.     Ms. Tania Schneider has not had any infections since her last office visit. The patient has no complaints which can be attributed to liver disease. The patient completes all daily activities without any functional limitations. The patient has not experienced fatigue, fevers, chills, shortness of breath, chest pain, pain in the right side over the liver, diffuse abdominal pain, nausea, vomiting, constipation, diarrhrea, dry eyes, dry mouth, arthralgias, myalgias, yellowing of the eyes or skin, itching, dark urine, problems concentrating, swelling of the abdomen, swelling of the lower extremities, hematemesis, or hematochezia. Allergies:  No known allergies. Current Outpatient Medications   Medication Sig Dispense Refill    mycophenolate (CELLCEPT) 500 mg tablet TAKE 1 TABLET BY MOUTH TWICE DAILY 180 Tab 3    multivitamin with iron (DAILY MULTI-VITAMINS/IRON) tablet Take 1 Tab by mouth daily.  ferrous fumarate 324 mg (106 mg iron) tab Take  by mouth.  cholecalciferol (VITAMIN D3) 1,000 unit cap Take  by mouth daily. SYSTEM REVIEW NOT RELATED TO LIVER DISEASE OR REVIEWED ABOVE:  Constitution systems: Negative for fever, chills, weight gain, weight loss. Eyes: Negative for visual changes. ENT: Negative for sore throat, painful swallowing. Respiratory: Negative for cough, hemoptysis, SOB. Cardiology: Negative for chest pain, palpitations.   GI:  Negative for constipation or diarrhea. : Negative for urinary frequency, dysuria, hematuria, nocturia. Skin: Negative for rash. Hematology: Negative for easy bruising, blood clots. Musculo-skelatal: Negative for back pain, muscle pain, weakness. Neurologic: Negative for headaches, dizziness, vertigo, memory problems not related to HE. Psychology: Negative for anxiety, depression. Family/Social History:  She is single with no children. She does not smoke tobacco.  She consumes alcohol rarely. She works full time as an . Physical Examination:    There were no vitals taken for this visit. No physical performed because the patient could not get her camera to work during his virtual visit. Laboratory Studies:  [facility] [place] [facility] [address] Units 2/8/2019 6/26/2018   WBC 3.8 - 10.8 Thousand/uL 7.1 7.4   ANC 1,500 - 7,800 cells/uL 4,068 4,092   HGB 11.7 - 15.5 g/dL 12.6 12.8    - 400 Thousand/uL 253 248   AST 10 - 35 U/L 21 22   ALT 6 - 29 U/L 24 24   Alk Phos 33 - 130 U/L 100 95   Bili, Total 0.2 - 1.2 mg/dL 0.5 0.4   Bili, Direct 0.0 - 0.2 mg/dL 0.1 0.1   Albumin 3.6 - 5.1 g/dL 3.8 3.7   BUN 7 - 25 mg/dL 15 15   Creat 0.50 - 1.05 mg/dL 0.84 0.85   Na 135 - 146 mmol/L 140 139   K 3.5 - 5.3 mmol/L 4.3 4.6   Cl 98 - 110 mmol/L 105 107   CO2 20 - 32 mmol/L 25 31   Glucose 65 - 99 mg/dL 93 90         Serologies and Other Pertinent Laboratories:  12/10:  RONAK positive, ASMA positive, ANCA negative, rheumatoid factor positive, LKM negative, SLA negative. 10/2015:  HAV antibody negative, HBV antibody negative    Liver Biopsy:  11/10:  Mild hepatitis with mild piecemeal necrosis and portal fibrosis. Biopsy slides need to be reviewed by MLS. Endoscopic Procedures:  None available. Radiology:  02/09:  Ultrasound liver  echogenic liver consistent with chronic liver disease, no liver mass lesions, no ascites.     09/10:  Ultrasound liver  echogenic liver consistent with chronic liver disease, no liver mass lesions, no ascites. Other Testing:  None available. Assessment and Plan:  Autoimmune hepatitis. The most recent laboratory studies indicate that the liver transaminases are normal, alkaline phosphatase is normal, tests of hepatic synthetic and metabolic function are normal, and the platelet count is normal.       The patient states that she had all her abs drawn last week at her PCP's office during her annual physical. I have requested that those labs be sent to me for review. Patient states that \"everything was normal\". Autoimmune hepatitis is being treated with 500 mg CellCept daily. After the patient reduced her dose of the cellCept from 500 mg bid to 500 every day, the liver enzymes remained normal.  Will review labs. The need to perform an assessment of liver fibrosis was discussed with the patient. Fibroscan can assess liver fibrosis and determine if a patient has advanced fibrosis or cirrhosis without the need for liver biopsy. This will be performed ather next in-person office visit in 6 months. Shear wave elastography was previously ordered but not performed. Vaccination for viral hepatitis A and B is recommended since the patient has no serologic evidence of previous exposure or vaccination with immunity. Alcohol counseling provided. FOLLOW-UP AFTER VIRTUAL VISIT:  Pursuant to the emergency declaration under the [facility] waiver authority and the [employer] and [employer] Act, this Virtual  Visit was conducted, with the patient's (and/or their legal guardian's) consent, to reduce the patient's risk of exposure to COVID-19 and provide necessary medical care. Services were provided through a video synchronous discussion virtually to substitute for an in-person clinic visit. The patient was located in their home.   The provider was located in the [facility] office. All of the issues listed above in the Assessment and Plan were discussed with the patient. All questions were answered. The patient expressed a clear understanding of the above. [address] in 6 months for fibroscan.     JOSE CRUZ Michael  [facility] [place] of [address], [address] [place] [address] Ellen Rock, [address] [place], [place] [id number]